# Patient Record
Sex: FEMALE | Race: WHITE
[De-identification: names, ages, dates, MRNs, and addresses within clinical notes are randomized per-mention and may not be internally consistent; named-entity substitution may affect disease eponyms.]

---

## 2020-07-22 VITALS — DIASTOLIC BLOOD PRESSURE: 53 MMHG | SYSTOLIC BLOOD PRESSURE: 110 MMHG

## 2022-01-28 ENCOUNTER — HOSPITAL ENCOUNTER (OUTPATIENT)
Dept: HOSPITAL 61 - KCIC MAMMO | Age: 62
End: 2022-01-28
Attending: FAMILY MEDICINE
Payer: MEDICARE

## 2022-01-28 DIAGNOSIS — Z12.31: Primary | ICD-10-CM

## 2022-01-28 PROCEDURE — 77063 BREAST TOMOSYNTHESIS BI: CPT

## 2022-01-28 PROCEDURE — 77067 SCR MAMMO BI INCL CAD: CPT

## 2022-01-28 NOTE — [facility]
Bilateral digital screening mammograms with 3-D tomosynthesis:



Reason for examination: Routine screening.



Comparison is made to previous study dated 9/24/2019.



Bilateral mammograms in CC and oblique projections were obtained with 2-D imaging and 3-D tomosynthes
is imaging on a Siemens Inspiration unit and reviewed on the workstation. Interpretation was made wit
h the benefit of CAD.



The skin shows no acute abnormalities. The nipples are flattened or inverted but unchanged. No abnorm
al axillary lymph nodes are seen. The breast parenchyma shows scattered fatty and fibroglandular dens
ity. (Breast density: Category B.) There continues to be a faint 5 mm nodular density above the nippl
e line approximately 3.5 cm posterior to the nipple in the right breast on oblique view which is stab
le. There are no new dominant masses, suspicious calcifications or architectural distortion. 



Impression:



No evidence of malignancy. Recommend routine screening.



BI-RAD Category 2: Benign.



"Our facility is accredited by the [employer] Mammography Program."



This patient's information has been entered into a reminder system for the patient to be notified wit
h the results of her examination and a target date for the next mammogram.



Electronically signed by: Charo Trejo MD (1/28/2022 11:40 AM) [id number]

## 2022-04-15 ENCOUNTER — HOSPITAL ENCOUNTER (EMERGENCY)
Dept: HOSPITAL 63 - ER | Age: 62
Discharge: HOME | End: 2022-04-15
Payer: MEDICARE

## 2022-04-15 VITALS
BODY MASS INDEX: 27.27 KG/M2 | SYSTOLIC BLOOD PRESSURE: 115 MMHG | HEIGHT: 63 IN | DIASTOLIC BLOOD PRESSURE: 72 MMHG | WEIGHT: 153.88 LBS
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
## 2022-04-15 DIAGNOSIS — S60.222A: Primary | ICD-10-CM
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
## 2022-04-15 PROCEDURE — 99283 EMERGENCY DEPT VISIT LOW MDM: CPT

## 2022-04-15 PROCEDURE — 73130 X-RAY EXAM OF HAND: CPT

## 2022-04-15 NOTE — PHYS DOC
Past History


Additional Past Medical Histor:  back pain,


Past Surgical History:  Hysterectomy, Other


Additional Past Surgical Histo:  stent,rotator cuff, carpal tunnel


Alcohol Use:  None





General Adult


EDM:


Chief Complaint:  HAND PROBLEM





HPI:


HPI:





Patient is a 61-year-old female presents with left hand pain.  Patient states 

that she fell on gravel last night and has pain to the top of her left hand.  

Range of motion and sensation are intact.  Bruising and some swelling noted.  

Denies taking anything for pain prior to arrival.  Pain is exacerbated by 

movement.





Review of Systems:


Review of Systems:


ROS


At least 10 ROS systems have been reviewed and are negative except as documented

in the HPI.


General: Negative except as outlined in HPI above.


Skin: Negative except as outlined in HPI above.


HEENT: Negative except as outlined in HPI above.


Neck: Negative except as outlined in HPI above.


Respiratory: Negative except as outlined in HPI above..


Cardiovascular: Negative except as outlined in HPI above.


Abdomen: Negative except as outlined in HPI above.


: Negative except as outlined in HPI above.


Back/MSK: Negative except as outlined in HPI above.


Neuro: Negative except as outlined in HPI above.


Psych: Negative except as outlined in HPI above.





Current Medications:


Current Meds:





Current Medications








 Medications


  (Trade)  Dose


 Ordered  Sig/Pamella  Start Time


 Stop Time Status Last Admin


Dose Admin


 


 Acetaminophen/


 Hydrocodone Bitart


  (Lortab 5/325)  1 tab  STK-MED ONCE  4/15/22 15:16


 4/15/22 15:16 DC  














Allergies:


Allergies:





Allergies








Coded Allergies Type Severity Reaction Last Updated Verified


 


  No Known Drug Allergies    4/15/22 No











Physical Exam:


PE:





Constitutional: Well developed, well nourished, no acute distress, non-toxic 

appearance. []


HENT: Normocephalic, atraumatic, bilateral external ears normal, oropharynx 

moist, no oral exudates, nose normal. []


Eyes: PERRLA, EOMI, conjunctiva normal, no discharge. [] 


Neck: Normal range of motion, no tenderness, supple, no stridor. [] 


Cardiovascular:Heart rate regular rhythm, no murmur []


Lungs & Thorax:  Bilateral breath sounds clear to auscultation []


Abdomen: Bowel sounds normal, soft, no tenderness, no masses, no pulsatile 

masses. [] 


Skin: Bruising to top of left hand, swelling


Back: No tenderness, no CVA tenderness. [] 


Extremities: Mild tenderness, ROM intact, swelling, radial pulse intact


Neurologic: Alert and oriented X 3, normal motor function, normal sensory 

function, no focal deficits noted. []


Psychologic: Affect normal, judgement normal, mood normal. []





Current Patient Data:


Vital Signs:





                                   Vital Signs








  Date Time  Temp Pulse Resp B/P (MAP) Pulse Ox O2 Delivery O2 Flow Rate FiO2


 


4/15/22 14:54  88 16 115/72 (86) 98 Room Air  











EKG:


EKG:


[]





Radiology/Procedures:


Radiology/Procedures:


[]Exam: XR HAND_LEFT 3 VIEWS





History: Pain in left hand after fall.





Comparison: None.





Findings:


Osseous mineralization is normal. No acute fracture or dislocaton. Mild 

degenerative changes of the interphalangeal joints with narrowing and osteophyte

 formation. Degenerative changes of the triscaphe and first CMC joints. No focal

 soft tissue swelling.





Impression: 


1.  No acute osseous abnormality of the left hand.





Electronically signed by: Billy Vega MD (4/15/2022 3:21 PM) [id number]





Heart Score:


C/O Chest Pain:  No


Risk Factors:


Risk Factors:  DM, Current or recent (<one month) smoker, HTN, HLP, family 

history of CAD, obesity.


Risk Scores:


Score 0 - 3:  2.5% MACE over next 6 weeks - Discharge Home


Score 4 - 6:  20.3% MACE over next 6 weeks - Admit for Clinical Observation


Score 7 - 10:  72.7% MACE over next 6 weeks - Early Invasive Strategies





Course & Med Decision Making:


Course & Med Decision Making


Pertinent Labs and Imaging studies reviewed. (See chart for details)





[] 59-year-old male presents with left hand pain, swelling, bruising.  Patient 

fell last night and injured her left hand.  Radial pulses intact.  Range of 

motion sensation intact.  Work-up in ER consisted of left hand x-ray.  Patient's

 pain was treated while in the ER.





X-ray is unremarkable.  No fracture seen.





Educated on RICE.  Advised patient to follow-up with PCP in the next 5 to 7 days

 if pain does not improve.  Patient sent home with pain medication.  ibuprofen 

at home for breakthrough pain.





Dragon Disclaimer:


Dragon Disclaimer:


This electronic medical record was generated, in whole or in part, using a voice

 recognition dictation system.





Departure


Departure:


Impression:  


   Primary Impression:  


   Hand contusion


   Qualified Codes:  S60.222A - Contusion of left hand, initial encounter


Disposition:  01 HOME / SELF CARE / HOMELESS


Condition:  STABLE


Referrals:  


JOYCE ALEXIS MD (PCP)


Patient Instructions:  Hand Contusion, Easy-to-Read





Additional Instructions:  


Rest, use ice, elevate to help with swelling and pain.  Ibuprofen for pain.  

Sending you home with a prescription for hydrocodone as well.  Please follow-up 

with your PCP in the next 5 to 7 days if pain does not improve.  Return to the 

emergency room if you have worsening symptoms or concerns.





EMERGENCY DEPARTMENT GENERAL DISCHARGE INSTRUCTIONS





Thank you for coming to [facility] (ED) today and trusting us

 with you 


care.  We trust that you had a positivie experience in our Emergency Department.

  If you 


wish to speak to the department management, you may call the director at 

[phone].





YOUR FOLLOW UP INSTRUCTIONS ARE AS FOLLOWS:





1.  Do you have a private Doctor?  If you do not have a private doctor, please 

ask for a 


resource list of physicians or clinics that may be able to assist you with 

follow up care.





2.  The Emergency Physician has interpreted your x-rays.  The X-Ray specialist 

will also 


review them.  If there is a change in the findings, you will be notified in 48 

hours when at 


all possible.





3.  A lab test or culture has been done, your results will be reviewed and you 

will be 


notified if you need a change in treatment.





ADDITIONAL INSTRUCTIONS AND INFORMATION:





1.  Your care today has been supervised by a physician who is specially trained 

in emergency 


care.  Many problems require more than one evaluation for a complete diagnosis 

and 


treatment.  We recommend that you schedule your follow up appointment as recomme

nded to 


ensure complete treatment of you illness or injury.  If you are unable to obtain

 follow up 


care and continue to have a problem, or if your condition worsens, we recommend 

that you 


return to the ED.





2.  We are not able to safely determine your condition over the phone nor are we

 able to 


give sound medical advice over the phone.  For these safety reasons, if you call

 for medical 


advice we will ask you to come to the ED for further evaluation.





3.  If you have any questions regarding these discharge instructions please call

 the ED at 


[phone].





SAFETY INFORMATION:





In the interest of safety, wellness, and injury prevention; we encourage you to 

wear your 


sealbelt, if you smoke; quite smoking, and we encourage family to use a 

protective helmet 


for bicycling and other sporting events that present an increased risk for head 

injury.





IF YOUR SYMPTOMS WORSEN OR NEW SYMPTOMS DEVELOP, OR YOU HAVE CONCERNS ABOUT YOUR

 CONDITION; 


OR IF YOUR CONDITION WORSENS WHILE YOU ARE WAITING FOR YOUR FOLLOW UP 

APPOINTMENT; EITHER 


CONTACT YOUR PRIMARY CARE DOCTOR, THE PHYSICIAN WHOSE NAME AND NUMBER YOU WERE 

GIVEN, OR 


RETURN TO THE ED IMMEDIATELY.


Scripts


Cephalexin (CEPHALEXIN) 500 Mg Tablet


1 TAB PO BID for infection for 7 Days, #14 TAB


   Prov: SINDI HERNANDEZ         4/15/22 


Hydrocodone Bit/Acetaminophen (HYDROCODONE-APAP 5-325  **) 1 Each Tablet


0.5-1 TAB PO PRN Q6HRS PRN for PAIN for 3 Days, #12 TAB 0 Refills


   Prov: SINDI HERNANDEZ         4/15/22











SINDI HERNANDEZ               Apr 15, 2022 16:31

## 2022-04-15 NOTE — RAD
Exam: XR HAND_LEFT 3 VIEWS



History: Pain in left hand after fall.



Comparison: None.



Findings:

Osseous mineralization is normal. No acute fracture or dislocaton. Mild degenerative changes of the i
nterphalangeal joints with narrowing and osteophyte formation. Degenerative changes of the triscaphe 
and first CMC joints. No focal soft tissue swelling.



Impression: 

1.  No acute osseous abnormality of the left hand.



Electronically signed by: Billy Vega MD (4/15/2022 3:21 PM) [id number]